# Patient Record
Sex: FEMALE | Race: BLACK OR AFRICAN AMERICAN | NOT HISPANIC OR LATINO | Employment: UNEMPLOYED | URBAN - METROPOLITAN AREA
[De-identification: names, ages, dates, MRNs, and addresses within clinical notes are randomized per-mention and may not be internally consistent; named-entity substitution may affect disease eponyms.]

---
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
## 2024-08-31 ENCOUNTER — HOSPITAL ENCOUNTER (OUTPATIENT)
Facility: CLINIC | Age: 19
Discharge: HOME | End: 2024-08-31
Attending: OBSTETRICS & GYNECOLOGY | Admitting: OBSTETRICS & GYNECOLOGY
Payer: COMMERCIAL

## 2024-08-31 ENCOUNTER — HOSPITAL ENCOUNTER (OUTPATIENT)
Facility: CLINIC | Age: 19
End: 2024-08-31
Admitting: OBSTETRICS & GYNECOLOGY
Payer: COMMERCIAL

## 2024-08-31 VITALS
HEIGHT: 65 IN | DIASTOLIC BLOOD PRESSURE: 65 MMHG | WEIGHT: 130 LBS | TEMPERATURE: 98.3 F | RESPIRATION RATE: 18 BRPM | BODY MASS INDEX: 21.66 KG/M2 | SYSTOLIC BLOOD PRESSURE: 104 MMHG

## 2024-08-31 PROBLEM — Z36.89 ENCOUNTER FOR TRIAGE IN PREGNANT PATIENT: Status: ACTIVE | Noted: 2024-08-31

## 2024-08-31 LAB
CLUE CELLS: NORMAL
CRYSTALS AMN MICRO: NORMAL
TRICHOMONAS, WET PREP: NORMAL
WBC'S/HIGH POWER FIELD, WET PREP: NORMAL
YEAST, WET PREP: NORMAL

## 2024-08-31 PROCEDURE — G0463 HOSPITAL OUTPT CLINIC VISIT: HCPCS

## 2024-08-31 PROCEDURE — 87210 SMEAR WET MOUNT SALINE/INK: CPT | Performed by: OBSTETRICS & GYNECOLOGY

## 2024-08-31 RX ORDER — ASPIRIN 81 MG/1
81 TABLET ORAL DAILY
COMMUNITY

## 2024-08-31 RX ORDER — VITAMIN A, VITAMIN C, VITAMIN D-3, VITAMIN E, VITAMIN B-1, VITAMIN B-2, NIACIN, VITAMIN B-6, CALCIUM, IRON, ZINC, COPPER 4000; 120; 400; 22; 1.84; 3; 20; 10; 1; 12; 200; 27; 25; 2 [IU]/1; MG/1; [IU]/1; MG/1; MG/1; MG/1; MG/1; MG/1; MG/1; UG/1; MG/1; MG/1; MG/1; MG/1
1 TABLET ORAL DAILY
COMMUNITY

## 2024-08-31 RX ORDER — QUINIDINE GLUCONATE 324 MG
240 TABLET, EXTENDED RELEASE ORAL DAILY PRN
COMMUNITY

## 2024-08-31 ASSESSMENT — ACTIVITIES OF DAILY LIVING (ADL)
ADLS_ACUITY_SCORE: 18
ADLS_ACUITY_SCORE: 18

## 2024-08-31 NOTE — PROVIDER NOTIFICATION
"   08/31/24 1043   Provider Notification   Provider Name/Title Moreno   Method of Notification Phone   Request Evaluate - Remote   Notification Reason Patient Arrived;Membrane Status;Lab/Diagnostic Study;Other (Comment)       Reviewed negative results for both wet prep and Fern with MD. Pt denies any vaginal itching or odor, no bleeding, no pain and feels baby moving. Pt to be discharged (FFN not sent). Reviewed \"when to call your doctor\" handout on discharge- pt verbarbalized understanding. Has appt in 3-4 days with her regular MD/MFM  FHT's 145 with 10x10's-moderate variability. No contractions  "

## 2024-08-31 NOTE — DISCHARGE INSTRUCTIONS
Learning About When to Call Your Doctor During Pregnancy (After 20 Weeks)  Overview  It's common to have concerns about what might be a problem when you're pregnant. Most pregnancies don't have any serious problems. But it's still important to know when to call your doctor if you have certain symptoms or signs of labor.  These are general suggestions. Your doctor may give you some more information about when to call.  When to call your doctor (after 20 weeks)  Call 911  anytime you think you may need emergency care. For example, call if:  You have severe vaginal bleeding. This means you are soaking through a pad each hour for 2 or more hours.  You have sudden, severe pain in your belly.  You have chest pain, are short of breath, or cough up blood.  You passed out (lost consciousness).  You have a seizure.  You see or feel the umbilical cord.  You think you are about to deliver your baby and can't make it safely to the hospital or birthing center.  Call your doctor now or seek immediate medical care if:  You have vaginal bleeding.  You have belly pain.  You have a fever.  You are dizzy or lightheaded, or you feel like you may faint.  You have signs of a blood clot in your leg (called a deep vein thrombosis), such as:  Pain in the calf, back of the knee, thigh, or groin.  Swelling in your leg or groin.  A color change on the leg or groin. The skin may be reddish or purplish, depending on your usual skin color.  You have symptoms of preeclampsia, such as:  Sudden swelling of your face, hands, or feet.  New vision problems (such as dimness, blurring, or seeing spots).  A severe headache.  You have a sudden release of fluid from your vagina. (You think your water broke.)  You've been having regular contractions for an hour. This means that you've had at least 6 contractions within 1 hour, even after you change your position and drink fluids.  You notice that your baby has stopped moving or is moving less than  "normal.  You have signs of heart failure, such as:  New or increased shortness of breath.  New or worse swelling in your legs, ankles, or feet.  Sudden weight gain, such as more than 2 to 3 pounds in a day or 5 pounds in a week.  Feeling so tired or weak that you cannot do your usual activities.  You have symptoms of a urinary tract infection. These may include:  Pain or burning when you urinate.  A frequent need to urinate without being able to pass much urine.  Pain in the flank, which is just below the rib cage and above the waist on either side of the back.  Blood in your urine.  Watch closely for changes in your health, and be sure to contact your doctor if:  You have vaginal discharge that smells bad.  You feel sad, anxious, or hopeless for more than a few days.  You have skin changes, such as a rash, itching, or a yellow color to your skin.  You have other concerns about your pregnancy.  If you have labor signs at 37 weeks or more  If you have signs of labor at 37 weeks or more, your doctor may tell you to call when your labor becomes more active. Symptoms of active labor include:  Contractions that are regular.  Contractions that are less than 5 minutes apart.  Contractions that are hard to talk through.  Follow-up care is a key part of your treatment and safety. Be sure to make and go to all appointments, and call your doctor if you are having problems. It's also a good idea to know your test results and keep a list of the medicines you take.  Where can you learn more?  Go to https://www.[employer].net/patiented  Enter N531 in the search box to learn more about \"Learning About When to Call Your Doctor During Pregnancy (After 20 Weeks).\"  Current as of: July 10, 2023  Content Version: 14.1    [id number] [employer]   Care instructions adapted under license by your healthcare professional. If you have questions about a medical condition or this instruction, always ask your healthcare " professional. [employer] disclaims any warranty or liability for your use of this information.

## 2024-08-31 NOTE — PLAN OF CARE
"Goal Outcome Evaluation:         Pt plans to deliver at [facility]- was on divert here. Follows MFM there weekly for IUGR  Data: [facility]: 2024  4:23 PM.  Reason for maternal/fetal assessment is leaking vaginal fluid. Patient reports at 1030 today she felt \"wetness\" that was unusual, has since improved-did not wear a pad here..  Patient is a .  Prenatal record reviewed. Pregnancy  has been complicated by IUGR.  Gestational Age 29w4d. VSS. Fetal movement active. Patient denies uterine contractions, vaginal bleeding, abdominal pain, pelvic pressure, nausea, vomiting, headache, visual disturbances, epigastric or URQ pain, significant edema. Support person is present.   Action: Verbal consent for EFM. Triage assessment completed. Bill of rights reviewed.  Response: Patient verbalized agreement with plan. Will contact Dr Tami Rodriguez with update and for further orders.      Pooled x 20 mins to collect FERN. Spec exam done, no obvious fluid noted   Fern and wet prep sent ( FFN collected first and held/if needed)               "

## 2025-07-22 ENCOUNTER — TELEPHONE (OUTPATIENT)
Dept: BEHAVIORAL HEALTH | Facility: CLINIC | Age: 20
End: 2025-07-22

## 2025-07-29 ENCOUNTER — HOSPITAL ENCOUNTER (OUTPATIENT)
Dept: BEHAVIORAL HEALTH | Facility: CLINIC | Age: 20
Discharge: HOME | End: 2025-07-29
Attending: PSYCHIATRY & NEUROLOGY | Admitting: PSYCHIATRY & NEUROLOGY
Payer: COMMERCIAL

## 2025-07-29 PROCEDURE — H0001 ALCOHOL AND/OR DRUG ASSESS: HCPCS

## 2025-07-29 ASSESSMENT — COLUMBIA-SUICIDE SEVERITY RATING SCALE - C-SSRS
2. HAVE YOU ACTUALLY HAD ANY THOUGHTS OF KILLING YOURSELF?: NO
1. HAVE YOU WISHED YOU WERE DEAD OR WISHED YOU COULD GO TO SLEEP AND NOT WAKE UP?: NO

## 2025-07-29 ASSESSMENT — ANXIETY QUESTIONNAIRES
6. BECOMING EASILY ANNOYED OR IRRITABLE: SEVERAL DAYS
4. TROUBLE RELAXING: SEVERAL DAYS
1. FEELING NERVOUS, ANXIOUS, OR ON EDGE: NOT AT ALL
7. FEELING AFRAID AS IF SOMETHING AWFUL MIGHT HAPPEN: NOT AT ALL
GAD7 TOTAL SCORE: 4
5. BEING SO RESTLESS THAT IT IS HARD TO SIT STILL: SEVERAL DAYS
3. WORRYING TOO MUCH ABOUT DIFFERENT THINGS: SEVERAL DAYS
GAD7 TOTAL SCORE: 4
2. NOT BEING ABLE TO STOP OR CONTROL WORRYING: NOT AT ALL

## 2025-07-29 ASSESSMENT — PATIENT HEALTH QUESTIONNAIRE - PHQ9: SUM OF ALL RESPONSES TO PHQ QUESTIONS 1-9: 2

## 2025-07-29 NOTE — PROGRESS NOTES
"  [facility]  PATIENT'S NAME: Azalia Valladares  PREFERRED NAME: Azalia  PRONOUNS:    MRN: [id number]  : 2005  ADDRESS: [address]  [facility] [address]  ACCT. NUMBER:  [id number]  DATE OF SERVICE: 25  START TIME: 11:00 AM  END TIME: 12:05 PM   PREFERRED PHONE: [phone]  May we leave a program related message: Yes  EMERGENCY CONTACT: was obtained Sumanth Villalobos (boyfriend) [phone] .  SERVICE MODALITY:  In-person  UNIVERSAL ADULT Substance Use Disorder DIAGNOSTIC ASSESSMENT    Identifying Information:  Patient is a 19 year old,    individual.  Patient was referred for an assessment by  .  Patient attended the session alone.    Chief Complaint:   The reason for seeking services at this time is: \"I got arrested for a DUI. I was drinking, I got into it with my boyfriend and he ended up calling the  on me. It was his birthday weekend and I was having a miscarriage. I was going through a rough time and wanted to numb the pain and have a good time. I have 3 kids and my mom was watching them that weekend. I was trying to leave and took the car. My  told me to come and get this done so I am here. My court date is on 25.\"   The problem(s) began in 2025. Patient has attempted to resolve these concerns in the past through stopping use. Patient does not appear to be in severe withdrawal, an imminent safety risk to self or others, or requiring immediate medical attention and may proceed with the assessment interview.    Social/Family History:  Patient reported they grew up in: I moved around a lot. We lived in [place] then [place]. I moved back to [place] then to [place] for awhile then when we came back to [place] I was in [place].  They were raised by mother. Patient reported that their childhood was it was a fun childhood. My mom was in school but we went to [employer] and did fun things together. I did skip " school often because we moved a lot. I had my first child at age 15. Patient describes current relationships with family of origin as good relationship with mother and siblings.      The patient describes their cultural background as [place] and Alevism culture.  Cultural influences and impact on patient's life structure, values, norms, and healthcare: NA.  Contextual influences on patient's health include: NA.  Patient identified their preferred language to be English. Patient reported they do not need the assistance of an  or other support involved in therapy.  Patient reports they are involved in community of isis activities.  They reports spirituality impacts recovery in the following ways:  I am Alevism      Patient reported had no significant delays in developmental tasks.   Patient's highest education level was some college. Patient identified the following learning problems: none reported.  Patient reports they are  able to understand written materials.    Patient reported the following relationship history: I have been with Shruthi since I was 13.  Patient's current relationship status is partnered / significant other for 6 years.   Patient identified their sexual orientation as heterosexual.  Patient reported having three children- 4, 2, 9 months.      Patient's current living/housing situation involves staying in own home/apartment.  They live with significant other and 3 children and they report that housing is stable. Patient identified partner, mother, siblings, and friends as part of their support system.  Patient identified the quality of these relationships as stable and meaningful.      Patient reports engaging in the following recreational/leisure activities: working out, spending time with kids, going to the mall, go cart racing, golfing, basketball.  Patient reports the following people are supportive of recovery: partner, mother and siblings.  Patient is currently a student and reports  they are able to function appropriately at school..  Patient reports their income is obtained through general assistance, county assistance, and spouse.  Patient does identify finances as a current stressor.  Cultural and socioeconomic factors do not affect the patient's access to services.      Patient reports the following substance related arrests or legal issues: DUI 5/4/2025. All three of the children were in the car during the DUI event- No CPS case is open at this time. Patient denies being on probation / parole / under the jurisdiction of the court.    Patient's Strengths and Limitations:  Patient identified the following strengths or resources that will help them succeed in treatment: friends / good social support, family support, and motivation. Things that may interfere with the patient's success in treatment include: financial hardship.     Assessments:  The following assessments were completed by patient for this visit:  PHQ9:       7/29/2025    11:00 AM   PHQ-9 SCORE   PHQ-9 Total Score 2   GAD7:       7/29/2025    11:00 AM   ROSEMARY-7 SCORE   Total Score 4   CAGE-AID:       7/29/2025    11:00 AM   CAGE-AID Total Score   Total Score 2   PROMIS 10-Global Health (all questions and answers displayed):       7/29/2025    11:00 AM   PROMIS 10   In general, would you say your health is: 5   In general, would you say your quality of life is: 5   In general, how would you rate your physical health? 5   In general, how would you rate your mental health, including your mood and your ability to think? 3   In general, how would you rate your satisfaction with your social activities and relationships? 5   In general, please rate how well you carry out your usual social activities and roles. (This includes activities at home, at work and in your community, and responsibilities as a parent, child, spouse, employee, friend, etc.) 5   To what extent are you able to carry out your everyday physical activities such as  walking, climbing stairs, carrying groceries, or moving a chair? 5   In the past 7 days, how often have you been bothered by emotional problems such as feeling anxious, depressed, or irritable? 1   In the past 7 days, how would you rate your fatigue on average? 1   In the past 7 days, how would you rate your pain on average, where 0 means no pain, and 10 means worst imaginable pain? 0   Global Mental Health Score 18   Global Physical Health Score 20   PROMIS TOTAL - SUBSCORES 38   [place] Suicide Severity Rating Scale (Lifetime/Recent)      8/31/2024     4:55 PM 7/29/2025    11:00 AM   [place] Suicide Severity Rating (Lifetime/Recent)   Q1 Wished to be Dead (Past Month) 0-->no    Q2 Suicidal Thoughts (Past Month) 0-->no    Q6 Suicide Behavior (Lifetime) 0-->no    Level of Risk per Screen no risks indicated     1. Wish to be Dead (Lifetime)  N   2. Non-Specific Active Suicidal Thoughts (Lifetime)  N       Data saved with a previous flowsheet row definition   GAIN-sliding scale:      7/29/2025    11:00 AM   When was the last time that you had significant problems...   with feeling very trapped, lonely, sad, blue, depressed or hopeless about the future? 2 to 12 months ago   with sleep trouble, such as bad dreams, sleeping restlessly, or falling asleep during the day? 2 to 12 months ago   with feeling very anxious, nervous, tense, scared, panicked or like something bad was going to happen? Past month   with becoming very distressed & upset when something reminded you of the past? Past month   with thinking about ending your life or committing suicide? Never          7/29/2025    11:00 AM   When was the last time that you did the following things 2 or more times?   Lied or conned to get things you wanted or to avoid having to do something? 2 to 12 months ago   Had a hard time paying attention at school, work or home? 2 to 12 months ago   Had a hard time listening to instructions at school, work or home? 2 to 12 months  ago   Were a bully or threatened other people? Never   Started physical fights with other people? Never     Personal and Family Medical History:  Patient did not report a family history of mental health concerns.  Patient reports family history includes Hypertension in her maternal grandfather and maternal grandmother..      Patient reported the following previous mental health diagnoses: No mental health diagnoses.  Patient reports their primary mental health symptoms include: some mild anxiety and these do not impact her ability to function.   Patient has not received mental health services in the past.  Psychiatric Hospitalizations: None.  Patient denies a history of civil commitment.  Current mental health services/providers include:  No current providers.    Patient has had a physical exam to rule out medical causes for current symptoms.  Date of last physical exam was within the past year. Client was encouraged to follow up with PCP if symptoms were to develop. The patient has a non-[facility] Primary Care Provider. Their PCP is [facility] .  Patient reports no current medical and/or dental concerns.  Patient denies any issues with pain.. Patient denies pregnancy..  There are not significant appetite / nutritional concerns / weight changes.  Patient does not report a history of an eating disorder.  Patient does not report a history of head injury / trauma / cognitive impairment.      Patient reports current meds as:   Current Outpatient Medications   Medication Sig Dispense Refill    aspirin 81 MG EC tablet Take 81 mg by mouth daily.      Ferrous Gluconate 240 (27 Fe) MG TABS Take 240 mg of iron by mouth daily as needed.      Prenatal Vit-Fe Fumarate-FA (PRENATAL MULTIVITAMIN  PLUS IRON) 27-1 MG TABS Take 1 tablet by mouth daily.       No current facility-administered medications for this encounter.     Medication Adherence:  Patient reports no current medications.  Patient is able to self-administer  medications.    Patient Allergies:    Allergies   Allergen Reactions    Cats     Peanut Butter Flavoring Agent (Non-Screening)     Peanut Butter Flavoring Agent (Non-Screening) Itching     Medical History:  No past medical history on file.    Substance Use:   Patient reported no family history of chemical health issues.  Patient has not received substance use disorder and/or gambling treatment in the past.  Patient has not ever been to detox.  Patient is not currently receiving any chemical dependency treatment. Patient reports no history of support group attendance.    Substance Age of first use Pattern and duration of use (include amounts and frequency) Date of last use     Withdrawal potential Route of administration   has used Alcohol 19 Current use: 3-5x over the past year. I will mix a drink I will do 1-2 drinks per setting. The day of my DUI I drank more than I normally do, I was very upset and then this happened.  5/4/25 No oral   has not used Cannabis    Denies         has not used Amphetamines    Denies       has not used Cocaine/crack     Denies       has not used Hallucinogens  Denies       has not used Inhalants  Denies       has not used Heroin  Denies       has not used Other Opiates  Denies       has not used Benzodiazepine    Denies       has not used Barbiturates  Denies       has not used Over the counter meds.  Denies       has not use Caffeine  Denies       has used Nicotine  16 Vape- 1 cart lasts about 1-1.5 months.  7/28/25 No smoked   has not used other substances not listed above:  Identify:   Denies       Patient reported the following problems as a result of their substance use: DUI.  Patient is not concerned about substance use. Patient reports nobody is concerned about their substance use.  Patient reports they obtain substances by liquor store.  Patient reports their recovery goals are complete abstinence.     Patient reports experiencing the following withdrawal symptoms within the  past 12 months: none and the following within the past 30 days: none.   Patients denies urges to use Alcohol.  Patient reports she has used more Alcohol than intended or over a longer period of time than intended. Patient reports she has not had unsuccessful attempts to cut down or control use of Alcohol.  Patient reports longest period of abstinence was 2 months. Patient reports she has not needed to use more Alcohol to achieve the same effect.  Patient does not report diminished effect with use of same amount of Alcohol.     Patient does not report a great deal of time is spent in activities necessary to obtain, use, or recover from Alcohol effects.  Patient does not report important social, occupational, or recreational activities are given up or reduced because of Alcohol use. No knowledge of having a persistent or recurrent physical or psychological problem that is likely to have caused or exacerbated by use.     Patient reports substance use has not ever impacted their ability to function in a school setting. Patient reports substance use has not ever impacted their ability to function in a work setting.  Patients demographics and history impact their recovery in the following ways:  No alcohol in the home.  Patient reports engaging in the following recreation/leisure activities while using:  hanging out with friends, doing things at home.  Patient reports the following people are supportive of recovery: boyfriend, mother, friends    Patient does not have a history of gambling concerns and/or treatment.  Patient does not have other addictive behaviors she is concerned about.      Dimension Scale Ratings:  Dimension 1: 0 Client displays full functioning with good ability to tolerate and cope with withdrawal discomfort. No signs or symptoms of intoxication or withdrawal or resolving signs or symptoms..  Summary to support rating Azalia has not had any alcohol since 5/4/25. No withdrawal symptoms observed or  reported, Dimension 2: 0 Client displays full functioning with good ability to cope with physical discomfort..  Summary to support rating Azalia reports no biomedical concerns. Overall she is able to access care as needed. She has insurance and a primary care clinic., Dimension 3: 0 Client has good impulse control and coping skills and presents no risk of harm to self or others. Client functions in,  Summary to support rating Azalia reports no formal mental health diagnoses. She reports some anxiety at times related to  duties as she is a young mother of 3. She does not endorse any PP depression or anxiety. No SI/SIB/HI/VI endorsed at time of assessment , Dimension 4: 0 Client is cooperative, motivated, ready to change, admits problems, committed to change, and engaged in treatment as a responsible participant..  Summary to support rating Azalia was calm and cooperative through the assessment. She states she will do what is needed to complete the requirements of her DUI. , Dimension 5: 0 Client recognizes risk well and is able to manage potential problems..  Summary to support rating Azalia has approx. 5 lifetime uses of alcohol. She understands the impact alcohol has had on her life and has stopped all use. She has coping skills and insight at this time. No high risk for return to use at this time. , and Dimension 6: 2 Client is engaged in structured, meaningful activity, but peers, family, significant other, and living environment are unsupportive, or there is criminal justice involvement by the client or among the client's peers, significant others, or in the client's living environment..  Summary to support rating Azalia has a pending DUI. She has family support for her sobriety. 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 She has stable and safe living environment.     Significant Losses / Trauma / Abuse / Neglect Issues:   Patient   did not serve in the .  There are not indications or  report of significant loss, trauma, abuse or neglect issues related to: are no indications and client denies any losses, trauma, abuse, or neglect concerns.  Patient has not been a victim of exploitation.  Concerns for possible neglect are not present.     Safety Assessment:   Patient denies current or past homicidal ideation and behaviors.  Patient denies current or past suicidal ideation and behaviors.  Patient denies current or past self-injurious behaviors.  Patient reported unsafe motor vehicle operation associated with substance use.  Patient denies any high risk behaviors associated with mental health symptoms.  Patient denied current or past personal safety concerns.    Patient denies past of current/recent assaultive behaviors.    Patient denied a history of sexual assault behaviors.     Patient reports there are not  ,   firearms in the house.    Patient reports the following protective factors: hopeful, identifies reason for living, access to and engagement with healthcare, strong bond to family unit, community, job, school, etc, supportive social network or family, lives in a responsibly safe environment, and responsibilities to others      Risk Plan:  See Recommendations for Safety and Risk Management Plan    Review of Symptoms per patient report:   Substance Use:  passing out, substance related legal problems, and driving under the influence     Collateral Contact Summary:   Collateral contacts contributing to this assessment:      Name    Sumanth Villalobos   Relationship    Boyfriend  Phone Number    [phone]  Releases    yes     Information Provided:     I do not have any concerns. Things took a turn for the worse when we had our most recent child. I think she had some postpartum stuff going on. Over the last year she has drank a handful of times. No use since the DUI. I have no other concerns.       If court related records were reviewed, summarize here: NA    Information in this assessment was  obtained from the medical record and provided by patient who is a good historian.    Patient will have open access to their mental health medical record.    Diagnostic Criteria: 4.) Craving, or a strong desire or urge to use alcohol/drug.  Met for Alcohol.      As evidenced by self report and criteria, client meets the following DSM5 Diagnoses:   (Sustained by DSM5 Criteria Listed Above)  No substance use diagnosis .    Functional Status:  Patient reports the following functional impairments:  operation of a motor vehicle.     OLIVER/DUAL Programmatic Care:  1. Does the patient have a history of vulnerability such as being teased, picked on, or other indications of potential safety issues with other residents?  No    2. Does this patient have a history of being the victim of abuse? No history of abuse reported or documented.    3. Does this patient have a history of victimizing others? No     4. Does the patient have a history of boundary violations?  No.    5. Does the patient have a history of other sexual acting out behaviors (e.g grooming)?   No    6. Does the patient have a history of threats to self or others? Fire setting, running away or other self-injurious behaviors?    No    7. Does the patient s history indicate the need for special precautions or particular staffing patterns in the facility?  No      NOTE: If this screening indicates that the patient is at risk to harm self or others, notify staff at referral location.    Recommendations:     1. Plan for Safety and Risk Management:  Recommended that patient call 911 or go to the local ED should there be a change in any of these risk factors.      Report to child / adult protection services was NA.     2. OLIVER Referrals:   Recommendations: Patient meets criteria for the following levels of care based on ASAM Criteria:  Withdrawal Management - No Withdrawal Management Indicated, Treatment/Recovery Services - No treatment recommendation.  Patient's placement  align's with the assessment and placement level of care recommendation based on current ASAM Dimension scale ratings.  Patient reports they are willing to follow these recommendations.  Patient would like the following family or other support people involved in their treatment:  NA. Patient does not have a history of opiate use.    3. Mental Health Referrals:  None      4. Clinical Substantiation/medical necessity for the above recommendations:  Azalia had a 1x DUI. She reports approximatley 5 lifetime alcohol uses. She has good support and structure. At this time she is not in need of substance use treatment.     5. Patient's identified no culturally specific needs related to treatment placement.      6. Recommendations for treatment focus:   No treatment recommended.      7.  Interactive Complexity: No    8. Safety Plan:      Nikia Safety Plan      Creation Date: 7/29/25       Step 1: Warning signs:    Warning Signs    Feeling overwhelmed    Anxiety      Step 2: Internal coping strategies - Things I can do to take my mind off my problems without contacting another person:    Strategies    Distract myself    Talk to people    Get out of the house      Step 3: People and social settings that provide distraction:    Name Contact Information    Boyfriend     Mother     Sister        Places    My home    My moms house      Step 4: People whom I can ask for help during a crisis:    Name Contact Information    Boyfriend       Step 5: Professionals or agencies I can contact during a crisis:      Suicide Prevention Lifeline Phone: Call or Text 963  Crisis Text Line: Text HOME to 024412     Step 6: Making the environment safer (plan for lethal means safety):   Did not identify any lethal methods     Optional: What is most important to me and worth living for?:      Nikia Safety Plan. Zakia Vergara and Albert Knutson. Used with permission of the authors.       Provider Name/ Credentials:  Ryanne CAMPOS  [place], [facility]   July 29, 2025

## 2025-07-30 NOTE — PROGRESS NOTES
Individual Session Summary   START TIME: 11:00 AM  END TIME: 12:05 PM    Duration: 1 hour 5 minutes       Data:  Met with client on this date for an individual substance use assessment. We completed the substance use assessment and signed ROIs. We discussed the purpose of collateral contacts, patient was agreeable to this writer reaching out and signed ROIs.      Intervention: Motivational interviewing to complete individual substance use assessment     Assessment: Patient was engaged during the assessment.      Plan: This writer will complete the OLIVER assessment and follow up with their collateral contact. Patient will be referred to program based on assessment outcome.         SOLITARIO Doan  7/30/2025

## 2025-07-31 ENCOUNTER — TELEPHONE (OUTPATIENT)
Dept: BEHAVIORAL HEALTH | Facility: CLINIC | Age: 20
End: 2025-07-31
Payer: COMMERCIAL

## 2025-07-31 NOTE — TELEPHONE ENCOUNTER
7/31/25:     Discussed OLIVER assessment findings. Stated I would send assessment to  as discussed (sent via secure email). Patient has no further questions.     SOLITARIO Doan 7/31/2025 1:36 PM

## 2025-08-05 ENCOUNTER — DOCUMENTATION ONLY (OUTPATIENT)
Dept: BEHAVIORAL HEALTH | Facility: CLINIC | Age: 20
End: 2025-08-05
Payer: COMMERCIAL